# Patient Record
Sex: FEMALE | Race: WHITE | Employment: PART TIME | URBAN - METROPOLITAN AREA
[De-identification: names, ages, dates, MRNs, and addresses within clinical notes are randomized per-mention and may not be internally consistent; named-entity substitution may affect disease eponyms.]

---

## 2019-07-09 ENCOUNTER — HOSPITAL ENCOUNTER (EMERGENCY)
Facility: CLINIC | Age: 64
Discharge: HOME | End: 2019-07-10
Attending: EMERGENCY MEDICINE | Admitting: EMERGENCY MEDICINE

## 2019-07-09 DIAGNOSIS — N30.00 ACUTE CYSTITIS WITHOUT HEMATURIA: ICD-10-CM

## 2019-07-09 DIAGNOSIS — K70.31 ALCOHOLIC CIRRHOSIS OF LIVER WITH ASCITES (H): ICD-10-CM

## 2019-07-09 LAB
BASOPHILS # BLD AUTO: 0 10E9/L (ref 0–0.2)
BASOPHILS NFR BLD AUTO: 0.2 %
DIFFERENTIAL METHOD BLD: ABNORMAL
EOSINOPHIL # BLD AUTO: 0.1 10E9/L (ref 0–0.7)
EOSINOPHIL NFR BLD AUTO: 0.9 %
ERYTHROCYTE [DISTWIDTH] IN BLOOD BY AUTOMATED COUNT: 13.6 % (ref 10–15)
HCT VFR BLD AUTO: 39.5 % (ref 35–47)
HGB BLD-MCNC: 13.6 G/DL (ref 11.7–15.7)
IMM GRANULOCYTES # BLD: 0 10E9/L (ref 0–0.4)
IMM GRANULOCYTES NFR BLD: 0.2 %
INR PPP: 1.33 (ref 0.86–1.14)
LYMPHOCYTES # BLD AUTO: 2.2 10E9/L (ref 0.8–5.3)
LYMPHOCYTES NFR BLD AUTO: 21.1 %
MCH RBC QN AUTO: 34.9 PG (ref 26.5–33)
MCHC RBC AUTO-ENTMCNC: 34.4 G/DL (ref 31.5–36.5)
MCV RBC AUTO: 101 FL (ref 78–100)
MONOCYTES # BLD AUTO: 1.2 10E9/L (ref 0–1.3)
MONOCYTES NFR BLD AUTO: 11.2 %
NEUTROPHILS # BLD AUTO: 7 10E9/L (ref 1.6–8.3)
NEUTROPHILS NFR BLD AUTO: 66.4 %
NRBC # BLD AUTO: 0 10*3/UL
NRBC BLD AUTO-RTO: 0 /100
PLATELET # BLD AUTO: 157 10E9/L (ref 150–450)
RBC # BLD AUTO: 3.9 10E12/L (ref 3.8–5.2)
WBC # BLD AUTO: 10.5 10E9/L (ref 4–11)

## 2019-07-09 PROCEDURE — 99284 EMERGENCY DEPT VISIT MOD MDM: CPT | Mod: Z6 | Performed by: EMERGENCY MEDICINE

## 2019-07-09 PROCEDURE — 82140 ASSAY OF AMMONIA: CPT | Performed by: EMERGENCY MEDICINE

## 2019-07-09 PROCEDURE — 85025 COMPLETE CBC W/AUTO DIFF WBC: CPT | Performed by: EMERGENCY MEDICINE

## 2019-07-09 PROCEDURE — 96374 THER/PROPH/DIAG INJ IV PUSH: CPT | Performed by: EMERGENCY MEDICINE

## 2019-07-09 PROCEDURE — 80320 DRUG SCREEN QUANTALCOHOLS: CPT | Performed by: EMERGENCY MEDICINE

## 2019-07-09 PROCEDURE — 25000128 H RX IP 250 OP 636: Performed by: EMERGENCY MEDICINE

## 2019-07-09 PROCEDURE — 80053 COMPREHEN METABOLIC PANEL: CPT | Performed by: EMERGENCY MEDICINE

## 2019-07-09 PROCEDURE — 85610 PROTHROMBIN TIME: CPT | Performed by: EMERGENCY MEDICINE

## 2019-07-09 PROCEDURE — 99284 EMERGENCY DEPT VISIT MOD MDM: CPT | Mod: 25 | Performed by: EMERGENCY MEDICINE

## 2019-07-09 PROCEDURE — 83690 ASSAY OF LIPASE: CPT | Performed by: EMERGENCY MEDICINE

## 2019-07-09 RX ORDER — ONDANSETRON 2 MG/ML
4 INJECTION INTRAMUSCULAR; INTRAVENOUS
Status: COMPLETED | OUTPATIENT
Start: 2019-07-09 | End: 2019-07-09

## 2019-07-09 RX ADMIN — ONDANSETRON 4 MG: 2 INJECTION INTRAMUSCULAR; INTRAVENOUS at 23:23

## 2019-07-09 ASSESSMENT — ENCOUNTER SYMPTOMS
ABDOMINAL DISTENTION: 1
FEVER: 0
NAUSEA: 1
SHORTNESS OF BREATH: 0
VOMITING: 1
BLOOD IN STOOL: 0

## 2019-07-09 ASSESSMENT — MIFFLIN-ST. JEOR: SCORE: 1132.62

## 2019-07-09 NOTE — ED AVS SNAPSHOT
[facility], [place], Emergency Department  [address]  [facility] [address]  Phone:  [phone]  Fax:  [phone]                                    Marcela Stockton   MRN: [id number]    Department:  [facility], Emergency Department   Date of Visit:  7/9/2019           After Visit Summary Signature Page    I have received my discharge instructions, and my questions have been answered. I have discussed any challenges I see with this plan with the nurse or doctor.    ..........................................................................................................................................  Patient/Patient Representative Signature      ..........................................................................................................................................  Patient Representative Print Name and Relationship to Patient    ..................................................               ................................................  Date                                   Time    ..........................................................................................................................................  Reviewed by Signature/Title    ...................................................              ..............................................  Date                                               Time          22EPIC Rev 08/18

## 2019-07-10 VITALS
TEMPERATURE: 98.1 F | SYSTOLIC BLOOD PRESSURE: 136 MMHG | BODY MASS INDEX: 22.61 KG/M2 | OXYGEN SATURATION: 99 % | WEIGHT: 132.4 LBS | HEIGHT: 64 IN | RESPIRATION RATE: 16 BRPM | DIASTOLIC BLOOD PRESSURE: 70 MMHG | HEART RATE: 108 BPM

## 2019-07-10 LAB
ALBUMIN SERPL-MCNC: 1.7 G/DL (ref 3.4–5)
ALBUMIN UR-MCNC: NEGATIVE MG/DL
ALP SERPL-CCNC: 342 U/L (ref 40–150)
ALT SERPL W P-5'-P-CCNC: 22 U/L (ref 0–50)
AMMONIA PLAS-SCNC: 39 UMOL/L (ref 10–50)
ANION GAP SERPL CALCULATED.3IONS-SCNC: 11 MMOL/L (ref 3–14)
APPEARANCE UR: ABNORMAL
AST SERPL W P-5'-P-CCNC: 42 U/L (ref 0–45)
BACTERIA #/AREA URNS HPF: ABNORMAL /HPF
BILIRUB SERPL-MCNC: 2 MG/DL (ref 0.2–1.3)
BILIRUB UR QL STRIP: NEGATIVE
BUN SERPL-MCNC: 1 MG/DL (ref 7–30)
CALCIUM SERPL-MCNC: 8.2 MG/DL (ref 8.5–10.1)
CHLORIDE SERPL-SCNC: 101 MMOL/L (ref 94–109)
CO2 SERPL-SCNC: 23 MMOL/L (ref 20–32)
COLOR UR AUTO: YELLOW
CREAT SERPL-MCNC: 0.57 MG/DL (ref 0.52–1.04)
ETHANOL SERPL-MCNC: 0.05 G/DL
GFR SERPL CREATININE-BSD FRML MDRD: >90 ML/MIN/{1.73_M2}
GLUCOSE SERPL-MCNC: 103 MG/DL (ref 70–99)
GLUCOSE UR STRIP-MCNC: NEGATIVE MG/DL
HGB UR QL STRIP: NEGATIVE
KETONES UR STRIP-MCNC: NEGATIVE MG/DL
LEUKOCYTE ESTERASE UR QL STRIP: ABNORMAL
LIPASE SERPL-CCNC: 51 U/L (ref 73–393)
MUCOUS THREADS #/AREA URNS LPF: PRESENT /LPF
NITRATE UR QL: POSITIVE
PH UR STRIP: 5.5 PH (ref 5–7)
POTASSIUM SERPL-SCNC: 3.3 MMOL/L (ref 3.4–5.3)
PROT SERPL-MCNC: 6.5 G/DL (ref 6.8–8.8)
RBC #/AREA URNS AUTO: <1 /HPF (ref 0–2)
SODIUM SERPL-SCNC: 135 MMOL/L (ref 133–144)
SOURCE: ABNORMAL
SP GR UR STRIP: 1 (ref 1–1.03)
SQUAMOUS #/AREA URNS AUTO: 2 /HPF (ref 0–1)
UROBILINOGEN UR STRIP-MCNC: NORMAL MG/DL (ref 0–2)
WBC #/AREA URNS AUTO: 6 /HPF (ref 0–5)

## 2019-07-10 PROCEDURE — 87088 URINE BACTERIA CULTURE: CPT | Performed by: EMERGENCY MEDICINE

## 2019-07-10 PROCEDURE — 87086 URINE CULTURE/COLONY COUNT: CPT | Performed by: EMERGENCY MEDICINE

## 2019-07-10 PROCEDURE — 81001 URINALYSIS AUTO W/SCOPE: CPT | Performed by: EMERGENCY MEDICINE

## 2019-07-10 PROCEDURE — 87186 SC STD MICRODIL/AGAR DIL: CPT | Performed by: EMERGENCY MEDICINE

## 2019-07-10 RX ORDER — CEPHALEXIN 250 MG/5ML
500 POWDER, FOR SUSPENSION ORAL 3 TIMES DAILY
Qty: 210 ML | Refills: 0 | Status: SHIPPED | OUTPATIENT
Start: 2019-07-10 | End: 2019-07-17

## 2019-07-10 RX ORDER — ONDANSETRON 4 MG/1
4 TABLET, ORALLY DISINTEGRATING ORAL EVERY 6 HOURS PRN
Qty: 10 TABLET | Refills: 0 | Status: SHIPPED | OUTPATIENT
Start: 2019-07-10 | End: 2019-07-13

## 2019-07-10 NOTE — DISCHARGE INSTRUCTIONS
Please make an appointment to follow up with Your Primary Care Provider or our Primary Care Center (phone: [phone]) in one week for recheck.

## 2019-07-10 NOTE — ED TRIAGE NOTES
"Patient is seen at [facility] every 6 months for f/u on Chirrosis of Liver. Patient skipped the appointment this month because she is feeling \"yucky\" per patient. Decreased appetite for last 3 months. Drinks 8 beers per day per patient.   "

## 2019-07-10 NOTE — ED PROVIDER NOTES
History     Chief Complaint   Patient presents with     Vomiting     Vomiting started 3 weeks to a month ago. Tonight vomiting has worsened and patient is feeling miserable. Hx Liver Disease and now abdomen is very enlarged. Also, having back pain and increased edema in feet.       HPI  Marcela Stockton is a 63 year old female who presents to the ER with complaints of nausea, vomiting and inability to hold down fluids. Patient states that her abdomen has become more and more distended over the past few weeks. Patient states that she has had no melena or bright blood per rectum and states that she has had previous paracentesis done five years ago. Patient states that she is not short of breath, has had no fevers, and is able to hold down some fluids and beer. Patient complains mostly of nausea, vomiting and a distended abdomen.    This part of the medical record was transcribed by Rosanna Robles Medical Scribe, from a dictation done by Jovany Meredith MD.     I have reviewed the Medications, Allergies, Past Medical and Surgical History, and Social History in the [employer] system.    Past Medical History:   Diagnosis Date     Liver cirrhosis (H) 2014     Uncomplicated asthma        Past Surgical History:   Procedure Laterality Date     APPENDECTOMY      age 43.       History reviewed. No pertinent family history.    Social History     Tobacco Use     Smoking status: Heavy Tobacco Smoker     Packs/day: 1.50     Years: 48.00     Pack years: 72.00     Smokeless tobacco: Never Used   Substance Use Topics     Alcohol use: Yes     Comment: Drinking 8 beers per day.           Allergies   Allergen Reactions     Cats      Sneezing ans wheezing.        Review of Systems   Constitutional: Negative for fever.   Respiratory: Negative for shortness of breath.    Gastrointestinal: Positive for abdominal distention, nausea and vomiting. Negative for blood in stool.   All other systems reviewed and are negative.      Physical Exam   BP:  "128/77  Pulse: 108  Heart Rate: 117  Temp: 98.4  F (36.9  C)  Resp: 16  Height: 161.3 cm (5' 3.5\")  Weight: 60.1 kg (132 lb 6.4 oz)  SpO2: 97 %      Physical Exam   Constitutional: She is oriented to person, place, and time. No distress.   Alert conversant ambulatory   HENT:   Head: Atraumatic.   Eyes: Pupils are equal, round, and reactive to light. EOM are normal.   Neck: Normal range of motion. Neck supple.   Cardiovascular: Regular rhythm.   Pulmonary/Chest: Breath sounds normal. She has no wheezes. She has no rales.   Abdominal: Soft. She exhibits distension. There is no tenderness.   Ascites present but abdomen not tense   Musculoskeletal: She exhibits no deformity.   Neurological: She is alert and oriented to person, place, and time.   Grossly intact and symmetric   Skin: Skin is warm.   Psychiatric: She has a normal mood and affect.   Nursing note and vitals reviewed.      ED Course        Procedures              Results for orders placed or performed during the hospital encounter of 07/09/19   CBC with platelets differential   Result Value Ref Range    WBC 10.5 4.0 - 11.0 10e9/L    RBC Count 3.90 3.8 - 5.2 10e12/L    Hemoglobin 13.6 11.7 - 15.7 g/dL    Hematocrit 39.5 35.0 - 47.0 %     (H) 78 - 100 fl    MCH 34.9 (H) 26.5 - 33.0 pg    MCHC 34.4 31.5 - 36.5 g/dL    RDW 13.6 10.0 - 15.0 %    Platelet Count 157 150 - 450 10e9/L    Diff Method Automated Method     % Neutrophils 66.4 %    % Lymphocytes 21.1 %    % Monocytes 11.2 %    % Eosinophils 0.9 %    % Basophils 0.2 %    % Immature Granulocytes 0.2 %    Nucleated RBCs 0 0 /100    Absolute Neutrophil 7.0 1.6 - 8.3 10e9/L    Absolute Lymphocytes 2.2 0.8 - 5.3 10e9/L    Absolute Monocytes 1.2 0.0 - 1.3 10e9/L    Absolute Eosinophils 0.1 0.0 - 0.7 10e9/L    Absolute Basophils 0.0 0.0 - 0.2 10e9/L    Abs Immature Granulocytes 0.0 0 - 0.4 10e9/L    Absolute Nucleated RBC 0.0    INR   Result Value Ref Range    INR 1.33 (H) 0.86 - 1.14   Comprehensive " metabolic panel   Result Value Ref Range    Sodium 135 133 - 144 mmol/L    Potassium 3.3 (L) 3.4 - 5.3 mmol/L    Chloride 101 94 - 109 mmol/L    Carbon Dioxide 23 20 - 32 mmol/L    Anion Gap 11 3 - 14 mmol/L    Glucose 103 (H) 70 - 99 mg/dL    Urea Nitrogen 1 (L) 7 - 30 mg/dL    Creatinine 0.57 0.52 - 1.04 mg/dL    GFR Estimate >90 >60 mL/min/[1.73_m2]    GFR Estimate If Black >90 >60 mL/min/[1.73_m2]    Calcium 8.2 (L) 8.5 - 10.1 mg/dL    Bilirubin Total 2.0 (H) 0.2 - 1.3 mg/dL    Albumin 1.7 (L) 3.4 - 5.0 g/dL    Protein Total 6.5 (L) 6.8 - 8.8 g/dL    Alkaline Phosphatase 342 (H) 40 - 150 U/L    ALT 22 0 - 50 U/L    AST 42 0 - 45 U/L   Ammonia   Result Value Ref Range    Ammonia 39 10 - 50 umol/L   Lipase   Result Value Ref Range    Lipase 51 (L) 73 - 393 U/L   Alcohol level blood   Result Value Ref Range    Ethanol g/dL 0.05 (H) <0.01 g/dL   UA reflex to Microscopic and Culture   Result Value Ref Range    Color Urine Yellow     Appearance Urine Slightly Cloudy     Glucose Urine Negative NEG^Negative mg/dL    Bilirubin Urine Negative NEG^Negative    Ketones Urine Negative NEG^Negative mg/dL    Specific Gravity Urine 1.004 1.003 - 1.035    Blood Urine Negative NEG^Negative    pH Urine 5.5 5.0 - 7.0 pH    Protein Albumin Urine Negative NEG^Negative mg/dL    Urobilinogen mg/dL Normal 0.0 - 2.0 mg/dL    Nitrite Urine Positive (A) NEG^Negative    Leukocyte Esterase Urine Moderate (A) NEG^Negative    Source Midstream Urine     RBC Urine <1 0 - 2 /HPF    WBC Urine 6 (H) 0 - 5 /HPF    Bacteria Urine Many (A) NEG^Negative /HPF    Squamous Epithelial /HPF Urine 2 (H) 0 - 1 /HPF    Mucous Urine Present (A) NEG^Negative /LPF            Assessments & Plan (with Medical Decision Making)     I have reviewed the nursing notes.  Patient was able to tolerate orally after her IV fluids.    At this time the patient has ascites but her abdomen is not tense and does not need paracentesis tonight.  This can be followed up on as an  outpatient.      I have reviewed the findings, diagnosis, plan and need for follow up with the patient.       Medication List      Started    cephALEXin 250 MG/5ML suspension  Commonly known as:  KEFLEX  500 mg, Oral, 3 TIMES DAILY     ondansetron 4 MG ODT tab  Commonly known as:  ZOFRAN ODT  4 mg, Oral, EVERY 6 HOURS PRN            Final diagnoses:   Alcoholic cirrhosis of liver with ascites (H)   Acute cystitis without hematuria     Please make an appointment to follow up with Your Primary Care Provider or our Primary Care Center (phone: [phone]) in one week for recheck.    Jovany Meredith MD      7/9/2019   [facility], [place], EMERGENCY DEPARTMENT     Jovany Meredith MD  07/10/19 0108

## 2019-07-12 ENCOUNTER — TELEPHONE (OUTPATIENT)
Dept: EMERGENCY MEDICINE | Facility: CLINIC | Age: 64
End: 2019-07-12

## 2019-07-12 LAB
BACTERIA SPEC CULT: ABNORMAL
BACTERIA SPEC CULT: ABNORMAL
Lab: ABNORMAL
SPECIMEN SOURCE: ABNORMAL

## 2019-07-12 NOTE — RESULT ENCOUNTER NOTE
Final Urine Culture Report on 7/12/19  Emergency Dep/Urgent Care discharge antibiotic prescribed: Cephalexin (Keflex) 500 mg capsule, 1 capsule (500 mg) by mouth 3 times daily for 7 days.  #1. Bacteria, >100,000 colonies/mL Klebsiella pneumoniae, is SUSCEPTIBLE to Antibiotic.    #2. Bacteria, 10,000 - 50,000 colonies/mL Strain 2 Klebsiella pneumoniae, is SUSCEPTIBLE to Antibiotic.    As per [place] ED Lab Result protocol, no change in antibiotic therapy.

## 2019-07-12 NOTE — TELEPHONE ENCOUNTER
[facility] result notification:    Reason for call  Notify of lab results, assess symptoms,  review ED providers recommendations (if necessary) and advise per ED lab result f/u protocol.    Lab result  Final Urine Culture Report on 7/12/19  Emergency Dep/Urgent Care discharge antibiotic prescribed: Cephalexin (Keflex) 500 mg capsule, 1 capsule (500 mg) by mouth 3 times daily for 7 days.  #1. Bacteria, >100,000 colonies/mL Klebsiella pneumoniae, is SUSCEPTIBLE to Antibiotic.    #2. Bacteria, 10,000 - 50,000 colonies/mL Strain 2 Klebsiella pneumoniae, is SUSCEPTIBLE to Antibiotic.    As per [place] ED Lab Result protocol, no change in antibiotic therapy.  Left voicemail message requesting a call back to [phone] between 10 a.m. and 6:30 p.m. for patient's ED/UC lab results.    PCP follow-up Questions asked: NO    Dyana Ervin RN    [facility] RN  Lung Nodule and ED Lab Results F/U RN  Epic pool (ED late result f/u RN) : [id number]   # [phone]

## 2019-07-24 NOTE — TELEPHONE ENCOUNTER
[facility] Emergency Department Lab result notification     Patient/parent Name  Marcela Eddietresa    ILEANA Assessment (Patient s current Symptoms), include time called.  [Insert Left message here if message left]:  Having burning with urination, new symptom reportedly.  Abx complete.  Diarrhea has subsided, nausea has subsided.  Is drinking large amounts of liquids.       Lab result (if applicable):  Final Urine Culture Report on 7/12/19  Emergency Dep/Urgent Care discharge antibiotic prescribed: Cephalexin (Keflex) 500 mg capsule, 1 capsule (500 mg) by mouth 3 times daily for 7 days.  #1. Bacteria, >100,000 colonies/mL Klebsiella pneumoniae, is SUSCEPTIBLE to Antibiotic.    #2. Bacteria, 10,000 - 50,000 colonies/mL Strain 2 Klebsiella pneumoniae, is SUSCEPTIBLE to Antibiotic.    As per [facility] ED Lab Result protocol, no change in antibiotic therapy.    RN Recommendations/Instructions per [facility] ED lab result protocol  To f/u with PCP.  May come back to ED.       Please Contact your PCP clinic or return to the Emergency department if your:    Symptoms return.    Symptoms do not improve after 3 days on antibiotic.    Symptoms do not resolve after completing antibiotic.    Symptoms worsen or other concerning symptom's.    PCP follow-up Questions asked: YES       Dyana Ervin RN    Greetz   Lung Nodule and ED Lab Results F/U RN  Epic pool (ED late result f/u RN) : P [id number]   # [phone]

## 2019-08-01 ENCOUNTER — NURSE TRIAGE (OUTPATIENT)
Dept: NURSING | Facility: CLINIC | Age: 64
End: 2019-08-01

## 2019-08-01 PROCEDURE — 99284 EMERGENCY DEPT VISIT MOD MDM: CPT | Mod: 25

## 2019-08-01 PROCEDURE — 99284 EMERGENCY DEPT VISIT MOD MDM: CPT | Mod: Z6 | Performed by: EMERGENCY MEDICINE

## 2019-08-01 PROCEDURE — 36415 COLL VENOUS BLD VENIPUNCTURE: CPT

## 2019-08-01 NOTE — TELEPHONE ENCOUNTER
"Pt calls in with concern of severe pain - >    Right flank / right kidney - been going on for several days     Pain 10/10 at times - comes and goes     Also complaint of difficulty urinating - has been going only a \" few drops \" when she goes  Is taking fluids     Pt is VERY concerned about COST - says she still has bill to pay from last ER visit   As well - has recently been taken off of her health insurance because her pension kicked in and now makes over the limit allowed     Pt advised that there are resources available in ER that would help with cost    Per protocol pt advised to go to ER now ( or at least an Urgent Care ) to be evaluated    Pt does have niece that would transport her   Pt not sure if she will follow advice    Protocol and care advice reviewed  Caller states understanding of the recommended disposition    Advised to call back if further questions or concerns    Xavier Weaver , RN / [facility]    Reason for Disposition    [1] SEVERE pain (e.g., excruciating, scale 8-10) AND [2] present > 1 hour    Additional Information    Negative: Passed out (i.e., lost consciousness, collapsed and was not responding)    Negative: Shock suspected (e.g., cold/pale/clammy skin, too weak to stand, low BP, rapid pulse)    Negative: Difficult to awaken or acting confused (e.g., disoriented, slurred speech)    Negative: Sounds like a life-threatening emergency to the triager    Negative: Followed a major injury to the back (e.g., MVA, fall > 10 feet or 3 meters, penetrating injury, etc.)    Negative: Back pain or flank pain during pregnancy    Negative: Upper, mid or lower back pain that occurs mainly in the midline    Protocols used: FLANK PAIN-A-AH      "

## 2019-08-02 ENCOUNTER — HOSPITAL ENCOUNTER (EMERGENCY)
Facility: CLINIC | Age: 64
Discharge: HOME | End: 2019-08-02
Attending: EMERGENCY MEDICINE | Admitting: EMERGENCY MEDICINE

## 2019-08-02 ENCOUNTER — APPOINTMENT (OUTPATIENT)
Dept: GENERAL RADIOLOGY | Facility: CLINIC | Age: 64
End: 2019-08-02
Attending: EMERGENCY MEDICINE

## 2019-08-02 ENCOUNTER — APPOINTMENT (OUTPATIENT)
Dept: CT IMAGING | Facility: CLINIC | Age: 64
End: 2019-08-02
Attending: EMERGENCY MEDICINE

## 2019-08-02 VITALS
OXYGEN SATURATION: 97 % | HEART RATE: 113 BPM | SYSTOLIC BLOOD PRESSURE: 126 MMHG | DIASTOLIC BLOOD PRESSURE: 75 MMHG | RESPIRATION RATE: 16 BRPM | TEMPERATURE: 98.4 F

## 2019-08-02 DIAGNOSIS — J18.9 PNEUMONIA OF RIGHT LOWER LOBE DUE TO INFECTIOUS ORGANISM: ICD-10-CM

## 2019-08-02 DIAGNOSIS — F17.210 CIGARETTE SMOKER: ICD-10-CM

## 2019-08-02 DIAGNOSIS — N39.0 URINARY TRACT INFECTION WITH HEMATURIA, SITE UNSPECIFIED: ICD-10-CM

## 2019-08-02 DIAGNOSIS — R31.9 URINARY TRACT INFECTION WITH HEMATURIA, SITE UNSPECIFIED: ICD-10-CM

## 2019-08-02 LAB
ALBUMIN SERPL-MCNC: 1.9 G/DL (ref 3.4–5)
ALBUMIN UR-MCNC: 30 MG/DL
ALP SERPL-CCNC: 192 U/L (ref 40–150)
ALT SERPL W P-5'-P-CCNC: 16 U/L (ref 0–50)
ANION GAP SERPL CALCULATED.3IONS-SCNC: 6 MMOL/L (ref 3–14)
APPEARANCE UR: ABNORMAL
AST SERPL W P-5'-P-CCNC: 23 U/L (ref 0–45)
BACTERIA #/AREA URNS HPF: ABNORMAL /HPF
BASOPHILS # BLD AUTO: 0.1 10E9/L (ref 0–0.2)
BASOPHILS NFR BLD AUTO: 0.9 %
BILIRUB SERPL-MCNC: 1.3 MG/DL (ref 0.2–1.3)
BILIRUB UR QL STRIP: ABNORMAL
BUN SERPL-MCNC: 6 MG/DL (ref 7–30)
CALCIUM SERPL-MCNC: 8.6 MG/DL (ref 8.5–10.1)
CHLORIDE SERPL-SCNC: 108 MMOL/L (ref 94–109)
CO2 SERPL-SCNC: 26 MMOL/L (ref 20–32)
COLOR UR AUTO: ABNORMAL
CREAT SERPL-MCNC: 0.66 MG/DL (ref 0.52–1.04)
DIFFERENTIAL METHOD BLD: ABNORMAL
EOSINOPHIL # BLD AUTO: 0.3 10E9/L (ref 0–0.7)
EOSINOPHIL NFR BLD AUTO: 4.2 %
ERYTHROCYTE [DISTWIDTH] IN BLOOD BY AUTOMATED COUNT: 14.3 % (ref 10–15)
ETHANOL SERPL-MCNC: <0.01 G/DL
GFR SERPL CREATININE-BSD FRML MDRD: >90 ML/MIN/{1.73_M2}
GLUCOSE SERPL-MCNC: 104 MG/DL (ref 70–99)
GLUCOSE UR STRIP-MCNC: NEGATIVE MG/DL
HCT VFR BLD AUTO: 36.2 % (ref 35–47)
HGB BLD-MCNC: 12 G/DL (ref 11.7–15.7)
HGB UR QL STRIP: ABNORMAL
HYALINE CASTS #/AREA URNS LPF: 28 /LPF (ref 0–2)
IMM GRANULOCYTES # BLD: 0 10E9/L (ref 0–0.4)
IMM GRANULOCYTES NFR BLD: 0.3 %
KETONES UR STRIP-MCNC: 5 MG/DL
LEUKOCYTE ESTERASE UR QL STRIP: ABNORMAL
LIPASE SERPL-CCNC: 92 U/L (ref 73–393)
LYMPHOCYTES # BLD AUTO: 2 10E9/L (ref 0.8–5.3)
LYMPHOCYTES NFR BLD AUTO: 26.1 %
MCH RBC QN AUTO: 33.8 PG (ref 26.5–33)
MCHC RBC AUTO-ENTMCNC: 33.1 G/DL (ref 31.5–36.5)
MCV RBC AUTO: 102 FL (ref 78–100)
MONOCYTES # BLD AUTO: 0.7 10E9/L (ref 0–1.3)
MONOCYTES NFR BLD AUTO: 8.6 %
MUCOUS THREADS #/AREA URNS LPF: PRESENT /LPF
NEUTROPHILS # BLD AUTO: 4.6 10E9/L (ref 1.6–8.3)
NEUTROPHILS NFR BLD AUTO: 59.9 %
NITRATE UR QL: NEGATIVE
NRBC # BLD AUTO: 0 10*3/UL
NRBC BLD AUTO-RTO: 0 /100
PH UR STRIP: 5.5 PH (ref 5–7)
PLATELET # BLD AUTO: 239 10E9/L (ref 150–450)
POTASSIUM SERPL-SCNC: 3.3 MMOL/L (ref 3.4–5.3)
PROT SERPL-MCNC: 6.8 G/DL (ref 6.8–8.8)
RBC # BLD AUTO: 3.55 10E12/L (ref 3.8–5.2)
RBC #/AREA URNS AUTO: 29 /HPF (ref 0–2)
SODIUM SERPL-SCNC: 140 MMOL/L (ref 133–144)
SOURCE: ABNORMAL
SP GR UR STRIP: 1.02 (ref 1–1.03)
SQUAMOUS #/AREA URNS AUTO: 83 /HPF (ref 0–1)
UROBILINOGEN UR STRIP-MCNC: 4 MG/DL (ref 0–2)
WBC # BLD AUTO: 7.6 10E9/L (ref 4–11)
WBC #/AREA URNS AUTO: 56 /HPF (ref 0–5)
YEAST #/AREA URNS HPF: ABNORMAL /HPF

## 2019-08-02 PROCEDURE — 80053 COMPREHEN METABOLIC PANEL: CPT | Performed by: EMERGENCY MEDICINE

## 2019-08-02 PROCEDURE — 83690 ASSAY OF LIPASE: CPT | Performed by: EMERGENCY MEDICINE

## 2019-08-02 PROCEDURE — 81001 URINALYSIS AUTO W/SCOPE: CPT | Performed by: EMERGENCY MEDICINE

## 2019-08-02 PROCEDURE — 71046 X-RAY EXAM CHEST 2 VIEWS: CPT

## 2019-08-02 PROCEDURE — 25000132 ZZH RX MED GY IP 250 OP 250 PS 637: Mod: GY | Performed by: EMERGENCY MEDICINE

## 2019-08-02 PROCEDURE — 85025 COMPLETE CBC W/AUTO DIFF WBC: CPT | Performed by: EMERGENCY MEDICINE

## 2019-08-02 PROCEDURE — 80320 DRUG SCREEN QUANTALCOHOLS: CPT | Performed by: EMERGENCY MEDICINE

## 2019-08-02 PROCEDURE — 74176 CT ABD & PELVIS W/O CONTRAST: CPT

## 2019-08-02 RX ORDER — CEFPODOXIME PROXETIL 100 MG/5ML
200 GRANULE, FOR SUSPENSION ORAL 2 TIMES DAILY
Qty: 140 ML | Refills: 0 | Status: SHIPPED | OUTPATIENT
Start: 2019-08-02 | End: 2019-08-09

## 2019-08-02 RX ORDER — CEFPODOXIME PROXETIL 100 MG/5ML
200 GRANULE, FOR SUSPENSION ORAL ONCE
Status: COMPLETED | OUTPATIENT
Start: 2019-08-02 | End: 2019-08-02

## 2019-08-02 RX ORDER — LIDOCAINE 40 MG/G
CREAM TOPICAL
Status: DISCONTINUED | OUTPATIENT
Start: 2019-08-02 | End: 2019-08-02 | Stop reason: HOSPADM

## 2019-08-02 RX ADMIN — CEFPODOXIME PROXETIL 200 MG: 100 GRANULE, FOR SUSPENSION ORAL at 05:00

## 2019-08-02 ASSESSMENT — ENCOUNTER SYMPTOMS
FEVER: 0
DIZZINESS: 1
ABDOMINAL PAIN: 0
CONFUSION: 0
DYSURIA: 1
FLANK PAIN: 1
DIFFICULTY URINATING: 1

## 2019-08-02 NOTE — DISCHARGE INSTRUCTIONS
Take antibiotic as directed.  Drink plenty of fluids.  Follow up this week with your primary care clinic provider.  Return if persistent symptoms.

## 2019-08-02 NOTE — ED AVS SNAPSHOT
[facility], Emergency Department  [address]  [facility] [address]  Phone:  [phone]  Fax:  [phone]                                    Marcela Stockton   MRN: [id number]    Department:  [facility], Emergency Department   Date of Visit:  8/1/2019           After Visit Summary Signature Page    I have received my discharge instructions, and my questions have been answered. I have discussed any challenges I see with this plan with the nurse or doctor.    ..........................................................................................................................................  Patient/Patient Representative Signature      ..........................................................................................................................................  Patient Representative Print Name and Relationship to Patient    ..................................................               ................................................  Date                                   Time    ..........................................................................................................................................  Reviewed by Signature/Title    ...................................................              ..............................................  Date                                               Time          22EPIC Rev 08/18

## 2019-08-02 NOTE — ED PROVIDER NOTES
[facility] EMERGENCY DEPARTMENT ([facility])    8/02/19       History     Chief Complaint   Patient presents with     Flank Pain     reports intermittent ride-sided flank pain that worsens at night, reports very minimal urine output and urine is dark, beginning of the month was diagnosed with UTI and treated with Keflex     The history is provided by the patient.     Marcela Stockton is a 63 year old female with a medical history significant for alcoholic cirrhosis, COPD and asthma who presents to the Emergency Department for evaluation of difficulty urinating, dysuria and right flank pain.  The patient has had worsening difficulty urinating as well as dysuria for the last couple of weeks, and she has subsequently developed right flank pain.  The patient reports that the flank pain will resolve during the day, but will return at night when she is laying down.  She denies any pain currently in the Emergency Department.  She does note that she has had some mild, intermittent dizziness, but she denies any syncopal episodes.  She denies any fevers, confusion, rashes or abdominal pain.  She denies any history of kidney stones.  Patient reports that she quit drinking alcohol 2 weeks ago.    I have reviewed the Medications, Allergies, Past Medical and Surgical History, and Social History in the [employer] system.    Past Medical History:   Diagnosis Date     Liver cirrhosis (H) 2014     Uncomplicated asthma        Past Surgical History:   Procedure Laterality Date     APPENDECTOMY      age 43.       History reviewed. No pertinent family history.    Social History     Tobacco Use     Smoking status: Heavy Tobacco Smoker     Packs/day: 1.00     Years: 48.00     Pack years: 48.00     Smokeless tobacco: Never Used   Substance Use Topics     Alcohol use: Not Currently     Comment: Drinking 8 beers per day.        No current facility-administered medications for this encounter.      Current Outpatient Medications   Medication      Cephalexin (KEFLEX PO)     LACTULOSE PO        Allergies   Allergen Reactions     Cats      Sneezing ans wheezing.          Review of Systems   Constitutional: Negative for appetite change, chills, diaphoresis, fatigue and fever.   Respiratory: Positive for cough. Negative for chest tightness and shortness of breath.    Cardiovascular: Negative for chest pain and palpitations.   Gastrointestinal: Negative for abdominal pain.   Genitourinary: Positive for difficulty urinating, dysuria and flank pain (right).   Musculoskeletal: Negative for arthralgias, myalgias, neck pain and neck stiffness.   Skin: Negative for rash.   Allergic/Immunologic: Negative for immunocompromised state.   Neurological: Positive for dizziness (intermittent; mild). Negative for weakness, light-headedness and headaches.   Psychiatric/Behavioral: Negative for confusion.   All other systems reviewed and are negative.      Physical Exam   BP: 123/80  Pulse: 113  Temp: 98.4  F (36.9  C)  Resp: 16  SpO2: 98 %      Physical Exam   Constitutional: She is oriented to person, place, and time. She appears well-developed and well-nourished. No distress.   HENT:   Head: Normocephalic and atraumatic.   Nose: Nose normal.   Mouth/Throat: Oropharynx is clear and moist. No oropharyngeal exudate.   Eyes: Pupils are equal, round, and reactive to light. Conjunctivae and EOM are normal. No scleral icterus.   Neck: Normal range of motion. Neck supple.   Cardiovascular: Normal rate, regular rhythm, normal heart sounds and intact distal pulses.   Pulmonary/Chest: Effort normal and breath sounds normal. No respiratory distress.   Abdominal: Soft. Bowel sounds are normal. She exhibits fluid wave and ascites. There is no tenderness. There is no CVA tenderness.   Musculoskeletal: Normal range of motion. She exhibits no edema or tenderness.   Neurological: She is alert and oriented to person, place, and time.   Skin: Skin is warm and dry. No rash noted. She is not  diaphoretic.   Psychiatric: She has a normal mood and affect. Her behavior is normal.   Nursing note and vitals reviewed.      ED Course   12:23 AM  The patient was seen and examined by Isra Toribio MD in Room ED06.        Procedures             Critical Care time:  none             Labs Ordered and Resulted from Time of ED Arrival Up to the Time of Departure from the ED   ROUTINE UA WITH MICROSCOPIC - Abnormal; Notable for the following components:       Result Value    Bilirubin Urine Small (*)     Ketones Urine 5 (*)     Blood Urine Trace (*)     Protein Albumin Urine 30 (*)     Urobilinogen mg/dL 4.0 (*)     Leukocyte Esterase Urine Large (*)     WBC Urine 56 (*)     RBC Urine 29 (*)     Bacteria Urine Many (*)     Yeast Urine Few (*)     Squamous Epithelial /HPF Urine 83 (*)     Mucous Urine Present (*)     Hyaline Casts 28 (*)     All other components within normal limits   CBC WITH PLATELETS DIFFERENTIAL - Abnormal; Notable for the following components:    RBC Count 3.55 (*)      (*)     MCH 33.8 (*)     All other components within normal limits   COMPREHENSIVE METABOLIC PANEL - Abnormal; Notable for the following components:    Potassium 3.3 (*)     Glucose 104 (*)     Urea Nitrogen 6 (*)     Albumin 1.9 (*)     Alkaline Phosphatase 192 (*)     All other components within normal limits   LIPASE   ALCOHOL ETHYL   PERIPHERAL IV CATHETER     Chest XR,  PA & LAT   Final Result   IMPRESSION: No acute abnormality.      JOSE BARRON MD      Abd/pelvis CT no contrast - Stone Protocol   Final Result   IMPRESSION:   1. No urinary stone or hydronephrosis.   2. Patchy infiltrate in the right posterior lung base may be   pneumonia. Follow-up recommended.   3. Cirrhotic liver and a moderate amount of ascites.   4. Cholelithiasis.      JOSE BARRON MD             Assessments & Plan (with Medical Decision Making)   Evidence of infiltrate on chest XR; possible pneumonia. No dyspnea, confusion, hypoxia, chest  pain. Does not appear acutely ill. No evidence of sepsis; non-toxic appearance. Urinalysis probable contaminant. Will treat to cover both urinary tract infection including possible pyelonephritis and possible pneumonia. Antibiotics started in the ED. Follow up with primary care and GI regarding chronic liver disease. No evidence of peritonitis. Given parameters for returning. Feels well, no vomiting, no distress at time of discharge. Instructed to return if persistent or new or worsening symptoms.    I have reviewed the nursing notes.    I have reviewed the findings, diagnosis, plan and need for follow up with the patient.       Medication List      ASK your doctor about these medications    cefpodoxime 100 MG/5ML suspension  Commonly known as:  VANTIN  200 mg, Oral, 2 TIMES DAILY  Ask about: Should I take this medication?            Final diagnoses:   Urinary tract infection with hematuria, site unspecified   Pneumonia of right lower lobe due to infectious organism (H)     IDusty, am serving as a trained medical scribe to document services personally performed by Isra Toribio MD, based on the provider's statements to me.   Isra PENN MD, was physically present and have reviewed and verified the accuracy of this note documented by Dusty Dominguez.    8/1/2019   [facility], [place], EMERGENCY DEPARTMENT     Isra Toribio MD  08/22/19 0033

## 2019-08-11 ENCOUNTER — TELEPHONE (OUTPATIENT)
Dept: EMERGENCY MEDICINE | Facility: CLINIC | Age: 64
End: 2019-08-11

## 2019-08-11 NOTE — TELEPHONE ENCOUNTER
"[facility] Emergency Department Lab result notification:    Reason for call  Requesting more antibiotics and pain medication.     ED visit Date: 8/2/19  Symptoms reported at ED visit  Flank Pain       reports intermittent ride-sided flank pain that worsens at night, reports very minimal urine output and urine is dark, beginning of the month was diagnosed with UTI and treated with Keflex      The history is provided by the patient.      Marcela Stockton is a 63 year old female with a medical history significant for alcoholic cirrhosis, COPD and asthma who presents to the Emergency Department for evaluation of difficulty urinating, dysuria and right flank pain.  The patient has had worsening difficulty urinating as well as dysuria for the last couple of weeks, and she has subsequently developed right flank pain.  The patient reports that the flank pain will resolve during the day, but will return at night when she is laying down.  She denies any pain currently in the Emergency Department.  She does note that she has had some mild, intermittent dizziness, but she denies any syncopal episodes.  She denies any fevers, confusion, rashes or abdominal pain.  She denies any history of kidney stones.  Patient reports that she quit drinking alcohol 2 weeks ago.        Miscellaneous information      Current symptoms  4:52PM: Spoke with Patient. She states she is having \"severe, sharp, debilitating\" back pain. Is requesting that the ED provider prescribe more antibiotics and pain medication for her.   Recommendations/Instructions  Advised Patient that the ED would not be able to provide this over the phone, that she would need to go in to the ED to have her symptoms assessed again and determine the best treatment.   Patient states that she cannot afford to go back in. Advised to contact her PCP clinic now ([facility]) and speak with the provider on call or their nurseline for further direction with her care as she is not " willing to go back to the ED right now. AVS from ED visit states to return if symptoms persist.  Patient states understanding and states she will call her clinic now.     Contact your PCP clinic or return to the Emergency department if your:    Symptoms worsen or other concerning symptom's.    [RN Name]  Claire Ramos RN  [facility] RN  Lung Nodule and ED Lab Result RN  Epic pool (ED late result f/u RN): [id number]  [facility] INCIDENTAL RADIOLOGY F/U NURSES: [id number]  # [phone]

## 2019-08-11 NOTE — TELEPHONE ENCOUNTER
5:15PM: Patient calling back. Wanted to let this RN know that she is also has blood in her sputum.   Advised patient that I am a lab result nurse and that she should be speaking with her provider or be going to the ED.   Advised to contact her clinic now or be seen in the ED now. Reviewed Patient's AVS stating that she should return to the ED if persistent symptoms or worsening.  Patient states understanding, states she does not have a way to get to the ED, she will call her clinic now.    Claire Ramos RN  [facility] RN  Lung Nodule and ED Lab Result RN  Epic pool (ED late result f/u RN): [id number]  [facility] INCIDENTAL RADIOLOGY F/U NURSES: [id number]  # [phone]

## 2019-08-22 ASSESSMENT — ENCOUNTER SYMPTOMS
WEAKNESS: 0
SHORTNESS OF BREATH: 0
LIGHT-HEADEDNESS: 0
DIAPHORESIS: 0
CHILLS: 0
HEADACHES: 0
APPETITE CHANGE: 0
NECK STIFFNESS: 0
FATIGUE: 0
NECK PAIN: 0
COUGH: 1
PALPITATIONS: 0
CHEST TIGHTNESS: 0
MYALGIAS: 0
ARTHRALGIAS: 0